# Patient Record
(demographics unavailable — no encounter records)

---

## 2025-06-11 NOTE — SOCIAL HISTORY
[Apartment] : [unfilled] lives in an apartment  [Cat] : cat [Bedroom] : not in the bedroom [Smokers in Household] : there are no smokers in the home

## 2025-06-11 NOTE — PHYSICAL EXAM
[Alert] : alert [Well Nourished] : well nourished [Healthy Appearance] : healthy appearance [No Acute Distress] : no acute distress [Normal Pupil & Iris Size/Symmetry] : normal pupil and iris size and symmetry [Well Developed] : well developed [No Photophobia] : no photophobia [No Discharge] : no discharge [Sclera Not Icteric] : sclera not icteric [Normal TMs] : both tympanic membranes were normal [Normal Lips/Tongue] : the lips and tongue were normal [Normal Outer Ear/Nose] : the ears and nose were normal in appearance [Normal Tonsils] : normal tonsils [Pale mucosa] : pale mucosa [No Thrush] : no thrush [Boggy Nasal Turbinates] : boggy and/or pale nasal turbinates [Supple] : the neck was supple [Normal Rate and Effort] : normal respiratory rhythm and effort [No Crackles] : no crackles [No Retractions] : no retractions [Bilateral Audible Breath Sounds] : bilateral audible breath sounds [Normal Rate] : heart rate was normal  [Normal S1, S2] : normal S1 and S2 [No murmur] : no murmur [Regular Rhythm] : with a regular rhythm [Normal Cervical Lymph Nodes] : cervical [Skin Intact] : skin intact  [No Rash] : no rash [No Skin Lesions] : no skin lesions [No clubbing] : no clubbing [No Edema] : no edema [No Cyanosis] : no cyanosis [Normal Mood] : mood was normal [Normal Affect] : affect was normal [Alert, Awake, Oriented as Age-Appropriate] : alert, awake, oriented as age appropriate [Wheezing] : no wheezing was heard [Dermatographism] : no dermatographism

## 2025-06-11 NOTE — HISTORY OF PRESENT ILLNESS
[Eczematous rashes] : eczematous rashes [Drug Allergies] : drug allergies [de-identified] : Mere is a 10-year-old female here for allergy evaluation.   Interval history: Returns for follow-up as her seasonal and perennial allergic rhinoconjunctivitis symptoms are exacerbated. Has been okay around the cats in the home but around dogs. Family is interested in starting allergy shots. She took zyrtec yesterday for her itchy nose and eyes symptoms. Does not like to use Flonase nose spray - in the past did 2-3 weeks trial with minimal improvement. Previously was a concern for food allergy but her testing was negative in October of 2023.  Patient states with heavy exertion she reports some trouble breathing and wheezing, but she has never pre-treated with albuterol. She and mother both deny baseline symptoms of asthma.   HPI: She reports itchy eyes, nasal congestion all year round. She uses flonase intermittently, which helps. She does report worsening nasal and ocular symptoms in the middle of the night when she is lying in bed, she gets bad sneezing and nasal congestion. She uses flonase with good response. She has used eye drops but unsure what kind. She tried zyrtec about 2 months ago, which helped a little bit. Mom reports that her pediatrician performed some blood tests looking for allergies and came back positive for dust mites, cat, trees, and weeds. Notably, family got a cat this past April right before the labs showed positive cat IgE. She sleeps with the cat in her bed. She has air purifier in the room. Does not have dust mite covers.    As part of allergy workup, pediatrician ordered "food allergy panel" even though Mere was eating an unrestricted diet without any reactions to foods. Labs showed positive IgE to the following foods:  wheat (0.44) - NOT avoiding wheat, tolerating.  Sesame (0.67) - had previously tolerated sesame but now avoiding Peanut (0.68), hazelnut (0.57), almond (0.36) - had previously tolerated sesame but now avoiding Pediatrician never prescribed epipen.   Mere tells me that back in [place] she would complain of shortness of breath. Mom thinks it is from her nasal congestion or from the altitude. She reports that this past winter she would have some wheezing with URIs but never apart from colds. She had a cough which took 2 weeks to go away.   Modified asthma predictive index (<=3 y/o with >=4 wheezing episodes/year)  Major criteria (>=1 is pos): [N ] Parent with asthma [N ] Physician confirmed atopic dermatitis [Y ] Aeroallergen sensitivity  Minor criteria (>=2 is pos) [N ] Wheezing present unrelated to colds [ ] Eos >=4% [N] Allergy to milk, egg, or peanuts

## 2025-07-25 NOTE — IMPRESSION
[Allergy Testing Dust Mite] : dust mites [Allergy Testing Cockroach] : cockroach [Allergy Testing Dog] : dog [Allergy Testing Cat] : cat [Allergy Testing Trees] : trees [_____] : grasses ([unfilled]) [Allergy Testing Weeds] : weeds

## 2025-07-29 NOTE — REASON FOR VISIT
[Routine Follow-Up] : a routine follow-up visit for [Mother] : mother [FreeTextEntry2] : allergic rhinitis , exercise induced asthma

## 2025-07-29 NOTE — PHYSICAL EXAM
[Alert] : alert [Well Nourished] : well nourished [Healthy Appearance] : healthy appearance [No Acute Distress] : no acute distress [Well Developed] : well developed [Normal Pupil & Iris Size/Symmetry] : normal pupil and iris size and symmetry [No Discharge] : no discharge [No Photophobia] : no photophobia [Sclera Not Icteric] : sclera not icteric [Normal TMs] : both tympanic membranes were normal [Normal Nasal Mucosa] : the nasal mucosa was normal [Normal Lips/Tongue] : the lips and tongue were normal [Normal Outer Ear/Nose] : the ears and nose were normal in appearance [Normal Tonsils] : normal tonsils [No Thrush] : no thrush [Pale mucosa] : pale mucosa [Supple] : the neck was supple [Normal Rate and Effort] : normal respiratory rhythm and effort [No Crackles] : no crackles [No Retractions] : no retractions [Bilateral Audible Breath Sounds] : bilateral audible breath sounds [Normal Rate] : heart rate was normal  [Normal S1, S2] : normal S1 and S2 [No murmur] : no murmur [Regular Rhythm] : with a regular rhythm [Soft] : abdomen soft [Not Tender] : non-tender [Not Distended] : not distended [No HSM] : no hepato-splenomegaly [Normal Cervical Lymph Nodes] : cervical [Skin Intact] : skin intact  [No Rash] : no rash [No Skin Lesions] : no skin lesions [No clubbing] : no clubbing [No Edema] : no edema [No Cyanosis] : no cyanosis [Normal Mood] : mood was normal [Normal Affect] : affect was normal [Alert, Awake, Oriented as Age-Appropriate] : alert, awake, oriented as age appropriate [Boggy Nasal Turbinates] : boggy and/or pale nasal turbinates [Posterior Pharyngeal Cobblestoning] : no posterior pharyngeal cobblestoning [Wheezing] : no wheezing was heard [de-identified] : L ear cerumen>R

## 2025-07-29 NOTE — HISTORY OF PRESENT ILLNESS
[de-identified] : Mere is a 10-year-old female here for follow up.   Last seen June -   ALLERGIC RHINITIS - blood testing previously positive to dust mite, cat, trees, weeds - itchy eyes, nasal congestion all year round . This past spring was particularly difficult for her symptom wise. After spring ended all symptoms resolved - Cats in the home, stopped sleeping with cat in bed, no heightened symptoms  - Meds: Zyrtec, flonase- she does not like using, has not used azelastine yet   EXERCISE INDUCED ASTHMA: wheezing with URI/ SOB With exertion. Spirometry a month ago limited by poor effort however looked nonobstructive. Was given 2 puffs Albuterol pre-exercise at last visit   No history or symptoms of food allergy, eczematous rashes, drug allergies.

## 2025-07-29 NOTE — PHYSICAL EXAM
[Alert] : alert [Well Nourished] : well nourished [Healthy Appearance] : healthy appearance [No Acute Distress] : no acute distress [Well Developed] : well developed [Normal Pupil & Iris Size/Symmetry] : normal pupil and iris size and symmetry [No Discharge] : no discharge [No Photophobia] : no photophobia [Sclera Not Icteric] : sclera not icteric [Normal TMs] : both tympanic membranes were normal [Normal Nasal Mucosa] : the nasal mucosa was normal [Normal Lips/Tongue] : the lips and tongue were normal [Normal Outer Ear/Nose] : the ears and nose were normal in appearance [Normal Tonsils] : normal tonsils [No Thrush] : no thrush [Pale mucosa] : pale mucosa [Supple] : the neck was supple [Normal Rate and Effort] : normal respiratory rhythm and effort [No Crackles] : no crackles [No Retractions] : no retractions [Bilateral Audible Breath Sounds] : bilateral audible breath sounds [Normal Rate] : heart rate was normal  [Normal S1, S2] : normal S1 and S2 [No murmur] : no murmur [Regular Rhythm] : with a regular rhythm [Soft] : abdomen soft [Not Tender] : non-tender [Not Distended] : not distended [No HSM] : no hepato-splenomegaly [Normal Cervical Lymph Nodes] : cervical [Skin Intact] : skin intact  [No Rash] : no rash [No Skin Lesions] : no skin lesions [No clubbing] : no clubbing [No Edema] : no edema [No Cyanosis] : no cyanosis [Normal Mood] : mood was normal [Normal Affect] : affect was normal [Alert, Awake, Oriented as Age-Appropriate] : alert, awake, oriented as age appropriate [Boggy Nasal Turbinates] : boggy and/or pale nasal turbinates [Posterior Pharyngeal Cobblestoning] : no posterior pharyngeal cobblestoning [Wheezing] : no wheezing was heard [de-identified] : L ear cerumen>R

## 2025-07-29 NOTE — HISTORY OF PRESENT ILLNESS
[de-identified] : Mere is a 10-year-old female here for follow up.   Last seen June -   ALLERGIC RHINITIS - blood testing previously positive to dust mite, cat, trees, weeds - itchy eyes, nasal congestion all year round . This past spring was particularly difficult for her symptom wise. After spring ended all symptoms resolved - Cats in the home, stopped sleeping with cat in bed, no heightened symptoms  - Meds: Zyrtec, flonase- she does not like using, has not used azelastine yet   EXERCISE INDUCED ASTHMA: wheezing with URI/ SOB With exertion. Spirometry a month ago limited by poor effort however looked nonobstructive. Was given 2 puffs Albuterol pre-exercise at last visit   No history or symptoms of food allergy, eczematous rashes, drug allergies.